# Patient Record
Sex: FEMALE | Race: WHITE | NOT HISPANIC OR LATINO | Employment: PART TIME
[De-identification: names, ages, dates, MRNs, and addresses within clinical notes are randomized per-mention and may not be internally consistent; named-entity substitution may affect disease eponyms.]

---

## 2022-05-23 ENCOUNTER — TRANSCRIBE ORDERS (OUTPATIENT)
Dept: OTHER | Age: 37
End: 2022-05-23

## 2022-05-23 DIAGNOSIS — N39.46 MIXED INCONTINENCE URGE AND STRESS: Primary | ICD-10-CM

## 2024-08-07 ENCOUNTER — TRANSFERRED RECORDS (OUTPATIENT)
Dept: HEALTH INFORMATION MANAGEMENT | Facility: CLINIC | Age: 39
End: 2024-08-07

## 2024-08-08 ENCOUNTER — TELEPHONE (OUTPATIENT)
Dept: BEHAVIORAL HEALTH | Facility: CLINIC | Age: 39
End: 2024-08-08

## 2024-08-08 NOTE — TELEPHONE ENCOUNTER
Writer received ANNY assessment with dimension ratings. Faxed to [facility] for scanning.    Pt will require a medical screen.

## 2024-08-08 NOTE — TELEPHONE ENCOUNTER
Pt wants to stay on benzodiazepines long term so she is looking for a different treatment facility. Remove from call list at this time

## 2024-08-08 NOTE — TELEPHONE ENCOUNTER
Date: 8/8/2024    To: SHANIA RN    Please call this patient at [facility]: [phone] to complete a medical screening to clarify her medications and medical condition and to complete a COVID-19 screening.      The patient has a history of:  Epilepsy with severe seizures.    Takes Lamitrogine    Currently at [address] [place] [address] Detox.     OUTSIDE: This is an outside referral so I will tube up the assessment and any other clinical documentation to the 6th floor.     Thanks,  MITCHELL Galeana

## 2024-08-08 NOTE — TELEPHONE ENCOUNTER
Writer received ANNY assessment from [facility] at [facility]. [phone].     Dimension ratings are not checked. Writer contacted [facility] and left JUANITA detailing the need for checked dimension boxes and left fax number and call back number.